# Patient Record
Sex: MALE | Race: WHITE | NOT HISPANIC OR LATINO
[De-identification: names, ages, dates, MRNs, and addresses within clinical notes are randomized per-mention and may not be internally consistent; named-entity substitution may affect disease eponyms.]

---

## 2020-07-06 PROBLEM — Z00.00 ENCOUNTER FOR PREVENTIVE HEALTH EXAMINATION: Status: ACTIVE | Noted: 2020-07-06

## 2020-07-16 ENCOUNTER — APPOINTMENT (OUTPATIENT)
Dept: ORTHOPEDIC SURGERY | Facility: CLINIC | Age: 65
End: 2020-07-16
Payer: COMMERCIAL

## 2020-07-16 VITALS — HEIGHT: 75 IN | BODY MASS INDEX: 21.01 KG/M2 | RESPIRATION RATE: 16 BRPM | WEIGHT: 169 LBS

## 2020-07-16 DIAGNOSIS — L81.9 DISORDER OF PIGMENTATION, UNSPECIFIED: ICD-10-CM

## 2020-07-16 DIAGNOSIS — Z86.59 PERSONAL HISTORY OF OTHER MENTAL AND BEHAVIORAL DISORDERS: ICD-10-CM

## 2020-07-16 DIAGNOSIS — Z87.09 PERSONAL HISTORY OF OTHER DISEASES OF THE NERVOUS SYSTEM AND SENSE ORGANS: ICD-10-CM

## 2020-07-16 DIAGNOSIS — H54.7 UNSPECIFIED VISUAL LOSS: ICD-10-CM

## 2020-07-16 DIAGNOSIS — Z87.898 PERSONAL HISTORY OF OTHER SPECIFIED CONDITIONS: ICD-10-CM

## 2020-07-16 DIAGNOSIS — Z82.61 FAMILY HISTORY OF ARTHRITIS: ICD-10-CM

## 2020-07-16 DIAGNOSIS — Z86.69 PERSONAL HISTORY OF OTHER DISEASES OF THE NERVOUS SYSTEM AND SENSE ORGANS: ICD-10-CM

## 2020-07-16 DIAGNOSIS — S69.90XA UNSPECIFIED INJURY OF UNSPECIFIED WRIST, HAND AND FINGER(S), INITIAL ENCOUNTER: ICD-10-CM

## 2020-07-16 DIAGNOSIS — Z87.438 PERSONAL HISTORY OF OTHER DISEASES OF MALE GENITAL ORGANS: ICD-10-CM

## 2020-07-16 DIAGNOSIS — Z86.2 PERSONAL HISTORY OF DISEASES OF THE BLOOD AND BLOOD-FORMING ORGANS AND CERTAIN DISORDERS INVOLVING THE IMMUNE MECHANISM: ICD-10-CM

## 2020-07-16 DIAGNOSIS — Z82.62 FAMILY HISTORY OF OSTEOPOROSIS: ICD-10-CM

## 2020-07-16 DIAGNOSIS — K31.9 DISEASE OF STOMACH AND DUODENUM, UNSPECIFIED: ICD-10-CM

## 2020-07-16 DIAGNOSIS — Z87.19 PERSONAL HISTORY OF OTHER DISEASES OF THE NERVOUS SYSTEM AND SENSE ORGANS: ICD-10-CM

## 2020-07-16 DIAGNOSIS — Z84.89 FAMILY HISTORY OF OTHER SPECIFIED CONDITIONS: ICD-10-CM

## 2020-07-16 DIAGNOSIS — Z87.19 PERSONAL HISTORY OF OTHER DISEASES OF THE DIGESTIVE SYSTEM: ICD-10-CM

## 2020-07-16 PROCEDURE — 99203 OFFICE O/P NEW LOW 30 MIN: CPT | Mod: 25

## 2020-07-16 PROCEDURE — 73110 X-RAY EXAM OF WRIST: CPT | Mod: 50

## 2020-07-16 PROCEDURE — 20605 DRAIN/INJ JOINT/BURSA W/O US: CPT | Mod: RT

## 2020-07-16 RX ORDER — MESALAMINE 375 MG/1
0.38 CAPSULE, EXTENDED RELEASE ORAL
Refills: 0 | Status: ACTIVE | COMMUNITY

## 2020-07-16 RX ORDER — DIAZEPAM 5 MG/ML
AMPUL (ML) INJECTION
Refills: 0 | Status: ACTIVE | COMMUNITY

## 2020-07-16 RX ORDER — RIFAXIMIN 550 MG/1
TABLET ORAL
Refills: 0 | Status: ACTIVE | COMMUNITY

## 2020-07-16 RX ORDER — BUPROPION HYDROCHLORIDE 75 MG/1
TABLET, FILM COATED ORAL
Refills: 0 | Status: ACTIVE | COMMUNITY

## 2020-07-16 RX ORDER — GLUC/MSM/COLGN2/HYAL/ANTIARTH3 375-375-20
TABLET ORAL
Refills: 0 | Status: ACTIVE | COMMUNITY

## 2020-07-16 RX ORDER — COLESTIPOL HYDROCHLORIDE 1 G/1
TABLET, FILM COATED ORAL
Refills: 0 | Status: ACTIVE | COMMUNITY

## 2020-07-16 RX ORDER — OXYCODONE 10 MG/1
10 TABLET ORAL
Refills: 0 | Status: ACTIVE | COMMUNITY

## 2020-07-16 RX ORDER — CHROMIUM 200 MCG
TABLET ORAL
Refills: 0 | Status: ACTIVE | COMMUNITY

## 2020-07-16 RX ORDER — MESALAMINE 1.2 G/1
TABLET, DELAYED RELEASE ORAL
Refills: 0 | Status: ACTIVE | COMMUNITY

## 2020-07-16 RX ORDER — PSYLLIUM HUSK 0.4 G
CAPSULE ORAL
Refills: 0 | Status: ACTIVE | COMMUNITY

## 2021-08-12 ENCOUNTER — APPOINTMENT (OUTPATIENT)
Dept: ORTHOPEDIC SURGERY | Facility: CLINIC | Age: 66
End: 2021-08-12
Payer: COMMERCIAL

## 2021-08-12 VITALS — RESPIRATION RATE: 16 BRPM | WEIGHT: 169 LBS | BODY MASS INDEX: 21.01 KG/M2 | HEIGHT: 75 IN

## 2021-08-12 DIAGNOSIS — Z87.39 PERSONAL HISTORY OF OTHER DISEASES OF THE MUSCULOSKELETAL SYSTEM AND CONNECTIVE TISSUE: ICD-10-CM

## 2021-08-12 PROCEDURE — 73110 X-RAY EXAM OF WRIST: CPT | Mod: RT

## 2021-08-12 PROCEDURE — 20605 DRAIN/INJ JOINT/BURSA W/O US: CPT | Mod: RT

## 2021-08-12 PROCEDURE — 73070 X-RAY EXAM OF ELBOW: CPT | Mod: RT

## 2021-08-12 PROCEDURE — 99213 OFFICE O/P EST LOW 20 MIN: CPT | Mod: 25

## 2021-08-12 NOTE — ASSESSMENT
[FreeTextEntry1] : Patient symptoms today more consistent with radiocarpal arthritis.  Discussed options and elected to undergo a radiocarpal injection.  Under informed consent sterile conditions radiocarpal injection performed using 1 cc of Kenalog 10 combined with 2% plain lidocaine.  If no significant relief or symptoms recur consider ordering MRI versus CT scan to better plan.  Discussed surgical options including radial styloidectomy combined with AIN PIN neurectomy which would be motion preserving versus PRC which is likely not possible depending on where on head of capitate.  Also discussed potential scaphoid excision and 4 bone fusion.

## 2021-08-12 NOTE — REASON FOR VISIT
[Follow-Up Visit] : a follow-up visit for [Wrist Pain] : wrist pain [Hand Pain] : hand pain [FreeTextEntry2] : right hand/wrist pain

## 2021-08-12 NOTE — HISTORY OF PRESENT ILLNESS
[Right] : right hand dominant [FreeTextEntry1] : 65 year old right hand dominant male here today for follow up for recurrent right hand/wrist pain. We had evaluated him last July 2020 and discussed new diagnosis of basal joint osteoarthritis and received an injection into the right basal joint. Patient reports injection provided no relief and here today to discuss further management. He is considering surgical intervention today.

## 2021-08-12 NOTE — PHYSICAL EXAM
[de-identified] : On exam he is tender over the radial scaphoid joint and radial styloid.  Multiple Heberden's nodes.  Not particularly tender at basal joint today.  Wrist extension 80.  Wrist flexion 35.  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

## 2021-08-30 ENCOUNTER — NON-APPOINTMENT (OUTPATIENT)
Age: 66
End: 2021-08-30

## 2021-09-09 ENCOUNTER — APPOINTMENT (OUTPATIENT)
Dept: ORTHOPEDIC SURGERY | Facility: CLINIC | Age: 66
End: 2021-09-09

## 2022-10-13 RX ORDER — COLESTIPOL HCL 5 G
2 GRANULES (GRAM) ORAL
Qty: 0 | Refills: 0 | DISCHARGE

## 2022-10-13 RX ORDER — DOXEPIN HCL 100 MG
1 CAPSULE ORAL
Qty: 0 | Refills: 0 | DISCHARGE

## 2022-10-13 RX ORDER — BUPROPION HYDROCHLORIDE 150 MG/1
1 TABLET, EXTENDED RELEASE ORAL
Qty: 0 | Refills: 0 | DISCHARGE

## 2022-10-13 RX ORDER — OXYCODONE HYDROCHLORIDE 5 MG/1
1 TABLET ORAL
Qty: 0 | Refills: 0 | DISCHARGE

## 2022-10-13 RX ORDER — L.ACIDOPH/B.ANIMALIS/B.LONGUM 15B CELL
1 CAPSULE ORAL
Qty: 0 | Refills: 0 | DISCHARGE

## 2022-10-13 RX ORDER — MESALAMINE 400 MG
4 TABLET, DELAYED RELEASE (ENTERIC COATED) ORAL
Qty: 0 | Refills: 0 | DISCHARGE

## 2022-10-13 RX ORDER — OMEGA-3 ACID ETHYL ESTERS 1 G
0 CAPSULE ORAL
Qty: 0 | Refills: 0 | DISCHARGE

## 2022-10-13 RX ORDER — CHLORHEXIDINE GLUCONATE 213 G/1000ML
1 SOLUTION TOPICAL ONCE
Refills: 0 | Status: DISCONTINUED | OUTPATIENT
Start: 2022-10-14 | End: 2022-10-14

## 2022-10-13 NOTE — ASU PATIENT PROFILE, ADULT - NSICDXPASTSURGICALHX_GEN_ALL_CORE_FT
PAST SURGICAL HISTORY:  History of colon surgery     S/P shoulder surgery Bilateral  with left clavicle titanium plate

## 2022-10-13 NOTE — ASU PATIENT PROFILE, ADULT - FALL HARM RISK - UNIVERSAL INTERVENTIONS
Bed in lowest position, wheels locked, appropriate side rails in place/Call bell, personal items and telephone in reach/Instruct patient to call for assistance before getting out of bed or chair/Non-slip footwear when patient is out of bed/[place] to call system/Physically safe environment - no spills, clutter or unnecessary equipment/Purposeful Proactive Rounding/Room/bathroom lighting operational, light cord in reach

## 2022-10-13 NOTE — ASU PATIENT PROFILE, ADULT - NS PREOP UNDERSTANDS INFO
bring id, insurance card, wear loose comfort clothes, no food from midnight, water till 4:30 AM./yes

## 2022-10-13 NOTE — ASU PATIENT PROFILE, ADULT - NSICDXPASTMEDICALHX_GEN_ALL_CORE_FT
PAST MEDICAL HISTORY:  2019 novel coronavirus disease (COVID-19) July 4 2022    Anxiety     Crohn disease     Hepatitis B immune     Hepatitis C     Major depression

## 2022-10-14 ENCOUNTER — RESULT REVIEW (OUTPATIENT)
Age: 67
End: 2022-10-14

## 2022-10-14 ENCOUNTER — TRANSCRIPTION ENCOUNTER (OUTPATIENT)
Age: 67
End: 2022-10-14

## 2022-10-14 ENCOUNTER — OUTPATIENT (OUTPATIENT)
Dept: OUTPATIENT SERVICES | Facility: HOSPITAL | Age: 67
LOS: 1 days | Discharge: HOME | End: 2022-10-14

## 2022-10-14 VITALS
SYSTOLIC BLOOD PRESSURE: 115 MMHG | WEIGHT: 169.76 LBS | TEMPERATURE: 98 F | HEIGHT: 75 IN | OXYGEN SATURATION: 98 % | HEART RATE: 88 BPM | RESPIRATION RATE: 16 BRPM | DIASTOLIC BLOOD PRESSURE: 72 MMHG

## 2022-10-14 VITALS
OXYGEN SATURATION: 98 % | DIASTOLIC BLOOD PRESSURE: 74 MMHG | SYSTOLIC BLOOD PRESSURE: 123 MMHG | TEMPERATURE: 97 F | RESPIRATION RATE: 16 BRPM | HEART RATE: 70 BPM

## 2022-10-14 DIAGNOSIS — Z98.890 OTHER SPECIFIED POSTPROCEDURAL STATES: Chronic | ICD-10-CM

## 2022-10-14 PROCEDURE — ZZZZZ: CPT

## 2022-10-14 PROCEDURE — 88311 DECALCIFY TISSUE: CPT | Mod: 26

## 2022-10-14 PROCEDURE — 88304 TISSUE EXAM BY PATHOLOGIST: CPT | Mod: 26

## 2022-10-14 RX ORDER — ACETAMINOPHEN 500 MG
650 TABLET ORAL ONCE
Refills: 0 | Status: DISCONTINUED | OUTPATIENT
Start: 2022-10-14 | End: 2022-10-14

## 2022-10-14 RX ORDER — ONDANSETRON 8 MG/1
4 TABLET, FILM COATED ORAL ONCE
Refills: 0 | Status: DISCONTINUED | OUTPATIENT
Start: 2022-10-14 | End: 2022-10-14

## 2022-10-14 RX ORDER — OXYCODONE AND ACETAMINOPHEN 5; 325 MG/1; MG/1
1 TABLET ORAL EVERY 4 HOURS
Refills: 0 | Status: DISCONTINUED | OUTPATIENT
Start: 2022-10-14 | End: 2022-10-14

## 2022-10-14 RX ORDER — OXYCODONE AND ACETAMINOPHEN 5; 325 MG/1; MG/1
2 TABLET ORAL EVERY 6 HOURS
Refills: 0 | Status: DISCONTINUED | OUTPATIENT
Start: 2022-10-14 | End: 2022-10-14

## 2022-10-14 NOTE — ASU DISCHARGE PLAN (ADULT/PEDIATRIC) - ASU DC SPECIAL INSTRUCTIONSFT
Please call to make follow up appt [phone] for 1 week. Elevate right arm with sling and keep dressing dry and in place. Can shower but keep arm/sling dry. Take pain RX given by Dr. Camarena as needed and indicated. Non weightbearing right arm/hand.

## 2022-10-14 NOTE — ASU DISCHARGE PLAN (ADULT/PEDIATRIC) - NS MD DC FALL RISK RISK
For information on Fall & Injury Prevention, visit: [email or web address] OR  [email or web address] OR  https://www.cdc.gov/steadi/patient.html

## 2022-10-14 NOTE — ASU DISCHARGE PLAN (ADULT/PEDIATRIC) - CARE PROVIDER_API CALL
Amaury Camarena  ORTHOPAEDIC SPORTS MEDICINE  [address] [facility] [address] [facility], [address]  [place], [place] [address]  Phone: [phone]  Fax: [phone]  Follow Up Time:

## 2022-10-25 LAB — SURGICAL PATHOLOGY STUDY: SIGNIFICANT CHANGE UP

## 2023-08-21 NOTE — PRE-ANESTHESIA EVALUATION ADULT - NSANTHNECKRD_ENT_A_CORE
Abraxane infusion complete. Pt tolerated well. VSS. NAD. IV to left forearm. Pt verbalized understanding of discharge instructions before leaving.    
No

## 2023-12-05 PROBLEM — U07.1 COVID-19: Chronic | Status: ACTIVE | Noted: 2022-10-13

## 2023-12-05 PROBLEM — K50.90 CROHN'S DISEASE, UNSPECIFIED, WITHOUT COMPLICATIONS: Chronic | Status: ACTIVE | Noted: 2022-10-13

## 2023-12-05 PROBLEM — Z78.9 OTHER SPECIFIED HEALTH STATUS: Chronic | Status: ACTIVE | Noted: 2022-10-13

## 2023-12-05 PROBLEM — F32.9 MAJOR DEPRESSIVE DISORDER, SINGLE EPISODE, UNSPECIFIED: Chronic | Status: ACTIVE | Noted: 2022-10-13

## 2023-12-05 PROBLEM — B19.20 UNSPECIFIED VIRAL HEPATITIS C WITHOUT HEPATIC COMA: Chronic | Status: ACTIVE | Noted: 2022-10-13

## 2023-12-05 PROBLEM — F41.9 ANXIETY DISORDER, UNSPECIFIED: Chronic | Status: ACTIVE | Noted: 2022-10-13

## 2023-12-13 ENCOUNTER — APPOINTMENT (OUTPATIENT)
Dept: UROLOGY | Facility: CLINIC | Age: 68
End: 2023-12-13
Payer: COMMERCIAL

## 2023-12-13 ENCOUNTER — APPOINTMENT (OUTPATIENT)
Dept: UROLOGY | Facility: CLINIC | Age: 68
End: 2023-12-13

## 2023-12-13 VITALS
HEART RATE: 77 BPM | TEMPERATURE: 98.3 F | SYSTOLIC BLOOD PRESSURE: 101 MMHG | DIASTOLIC BLOOD PRESSURE: 67 MMHG | OXYGEN SATURATION: 98 %

## 2023-12-13 DIAGNOSIS — Z83.79 FAMILY HISTORY OF OTHER DISEASES OF THE DIGESTIVE SYSTEM: ICD-10-CM

## 2023-12-13 DIAGNOSIS — Z86.39 PERSONAL HISTORY OF OTHER ENDOCRINE, NUTRITIONAL AND METABOLIC DISEASE: ICD-10-CM

## 2023-12-13 DIAGNOSIS — Z87.891 PERSONAL HISTORY OF NICOTINE DEPENDENCE: ICD-10-CM

## 2023-12-13 DIAGNOSIS — K50.919 CROHN'S DISEASE, UNSPECIFIED, WITH UNSPECIFIED COMPLICATIONS: ICD-10-CM

## 2023-12-13 PROCEDURE — 99204 OFFICE O/P NEW MOD 45 MIN: CPT

## 2023-12-13 RX ORDER — ROSUVASTATIN CALCIUM 5 MG/1
TABLET, FILM COATED ORAL
Refills: 0 | Status: ACTIVE | COMMUNITY

## 2023-12-13 NOTE — PHYSICAL EXAM
[General Appearance - Well Developed] : well developed [General Appearance - Well Nourished] : well nourished [Normal Appearance] : normal appearance [Well Groomed] : well groomed [General Appearance - In No Acute Distress] : no acute distress [Edema] : no peripheral edema [] : no respiratory distress [Exaggerated Use Of Accessory Muscles For Inspiration] : no accessory muscle use [Urethral Meatus] : meatus normal [Penis Abnormality] : normal circumcised penis [Urinary Bladder Findings] : the bladder was normal on palpation [Scrotum] : the scrotum was normal [Testes Tenderness] : no tenderness of the testes [Testes Mass (___cm)] : there were no testicular masses [Prostate Tenderness] : the prostate was not tender [No Prostate Nodules] : no prostate nodules [Prostate Size ___ gm] : prostate size [unfilled] gm [Normal Station and Gait] : the gait and station were normal for the patient's age [No Focal Deficits] : no focal deficits [Oriented To Time, Place, And Person] : oriented to person, place, and time [Affect] : the affect was normal [Mood] : the mood was normal [Not Anxious] : not anxious [de-identified] :  exam with chaperone Cedrick

## 2023-12-13 NOTE — ASSESSMENT
[FreeTextEntry1] : 67 yo male with elevated PSA 11.6 ng/ml, no prior MRI, biopsy naive, neg FH, neg JIM with slight urgency and weak flow.  1. No concern for UTI however we will r/o infection before proceeding with MRI-- UA UCx 2. Repeat PSA 3. Prostate MRI-- he will schedule this for when he returns from vacation 4. Offered RDP work flow- he declined. He was told this could wait 1-2 years before being evaluated. I did urge the patient to address this in a timely fashion. 5. Flomax offered- he declined 6. Limit fluids 4 hours prior to bed-- he only gets up once   Follow up after MRI for formal image review with Dr Dacosta

## 2023-12-13 NOTE — HISTORY OF PRESENT ILLNESS
[FreeTextEntry1] : Dear Dr. Ross (PCP)  Thank you so much for the referral to help care for your patient.    Chief Complaint: Elevated PSA Date of first visit: 12/13/2023   FELIPE JORDAN  is a 68 year old  man with PMHx Crohn's disease, HLD, anxiety who presents for elevated PSA. His PSA is 11.6 ng/ml. This was repeated after value was 4.1 ng/ml. He is unaware of prior levels. He has never had a prostate MRI, is biopsy naive, and denies family hx of prostate cancer.  He denies infectious urinary symptoms-- no dysuria, frequency, foul odor, hematuria, incomplete emptying. He does experience slight urgency and weak flow x 9 months. He does not feel he needs alpha blockers.  PSA Hx  11.60 12/4/23 4.18 11/6/23    Prostate cancer screening: the patient and I spoke at length about prostate cancer screening, its risks and its benefits. The patient has 2 (age, PSA) risk factors for prostate cancer.  He understands that many men with prostate cancer will die with the disease rather than of it and we also discussed the results large multi-center American and  prostate cancer screening trials. He also understands that PSA in and of itself does not diagnose prostate cancer but only assesses risk to a certain degree. The patient understands that to definitively screen for prostate cancer, a biopsy is required and this procedure has risks, including bleeding, infection, ED and urinary retention. The patient opted to prostate MRI.  The patient denies fevers, chills, nausea and or vomiting and no unexplained weight loss.  All pertinent laboratory, films and physician notes were reviewed.  Questionnaire results were discussed with patient.S

## 2023-12-15 ENCOUNTER — NON-APPOINTMENT (OUTPATIENT)
Age: 68
End: 2023-12-15

## 2023-12-15 LAB
APPEARANCE: CLEAR
BACTERIA UR CULT: NORMAL
BACTERIA: NEGATIVE /HPF
BILIRUBIN URINE: NEGATIVE
BLOOD URINE: NEGATIVE
CAST: 0 /LPF
COLOR: NORMAL
EPITHELIAL CELLS: 0 /HPF
GLUCOSE QUALITATIVE U: NEGATIVE MG/DL
KETONES URINE: NEGATIVE MG/DL
LEUKOCYTE ESTERASE URINE: NEGATIVE
MICROSCOPIC-UA: NORMAL
NITRITE URINE: NEGATIVE
PH URINE: 5
PROTEIN URINE: NEGATIVE MG/DL
PSA FREE FLD-MCNC: 11 %
PSA FREE SERPL-MCNC: 0.66 NG/ML
PSA SERPL-MCNC: 5.84 NG/ML
RED BLOOD CELLS URINE: 0 /HPF
SPECIFIC GRAVITY URINE: 1.02
UROBILINOGEN URINE: 0.2 MG/DL
WHITE BLOOD CELLS URINE: 0 /HPF

## 2024-01-16 ENCOUNTER — OUTPATIENT (OUTPATIENT)
Dept: OUTPATIENT SERVICES | Facility: HOSPITAL | Age: 69
LOS: 1 days | End: 2024-01-16

## 2024-01-16 ENCOUNTER — APPOINTMENT (OUTPATIENT)
Dept: MRI IMAGING | Facility: CLINIC | Age: 69
End: 2024-01-16
Payer: COMMERCIAL

## 2024-01-16 ENCOUNTER — RESULT REVIEW (OUTPATIENT)
Age: 69
End: 2024-01-16

## 2024-01-16 DIAGNOSIS — Z98.890 OTHER SPECIFIED POSTPROCEDURAL STATES: Chronic | ICD-10-CM

## 2024-01-16 PROCEDURE — 76498P: CUSTOM | Mod: 26

## 2024-01-16 PROCEDURE — 72197 MRI PELVIS W/O & W/DYE: CPT | Mod: 26

## 2024-01-16 NOTE — ADDENDUM
[FreeTextEntry1] : I, Dr. Dacosta, personally performed the evaluation and management (E/M) services for this established patient who presents today with (a) new problem(s)/exacerbation of (an) existing condition(s).  That E/M includes conducting the examination, assessing all new/exacerbated conditions, and establishing a new plan of care.  Today, my ACP, Barbara Suarez, was here to observe my evaluation and management services for this new problem/exacerbated condition to be followed going forward.

## 2024-01-17 ENCOUNTER — APPOINTMENT (OUTPATIENT)
Dept: UROLOGY | Facility: CLINIC | Age: 69
End: 2024-01-17
Payer: COMMERCIAL

## 2024-01-17 VITALS
HEIGHT: 75 IN | OXYGEN SATURATION: 97 % | DIASTOLIC BLOOD PRESSURE: 74 MMHG | HEART RATE: 83 BPM | SYSTOLIC BLOOD PRESSURE: 120 MMHG | TEMPERATURE: 97.2 F | BODY MASS INDEX: 20.39 KG/M2 | WEIGHT: 164 LBS

## 2024-01-17 PROCEDURE — 99214 OFFICE O/P EST MOD 30 MIN: CPT

## 2024-01-17 RX ORDER — TAMSULOSIN HYDROCHLORIDE 0.4 MG/1
0.4 CAPSULE ORAL
Qty: 90 | Refills: 3 | Status: ACTIVE | COMMUNITY
Start: 2024-01-17 | End: 1900-01-01

## 2024-01-17 NOTE — PHYSICAL EXAM
[General Appearance - Well Developed] : well developed [Normal Appearance] : normal appearance [Edema] : no peripheral edema [] : no respiratory distress [Urethral Meatus] : meatus normal [Penis Abnormality] : normal circumcised penis [Testes Tenderness] : no tenderness of the testes [Testes Mass (___cm)] : there were no testicular masses [No Prostate Nodules] : no prostate nodules [Prostate Size ___ gm] : prostate size [unfilled] gm [Oriented To Time, Place, And Person] : oriented to person, place, and time [de-identified] : JIM 12/17 negative Suarez [de-identified] : anxious

## 2024-01-17 NOTE — ASSESSMENT
[FreeTextEntry1] : 67 yo male with elevated PSA 11.6 ng/ml, no prior MRI, biopsy naive, neg FH, neg JIM with slight urgency and weak flow.  he could have a shy bladder also PSA / LUTS could be impacted by crohn's  given patients primary issue with LUTS we will address that first then elevated PSA  1. No concern for UTI however we will r/o infection before proceeding with MRI-- UA UCx 2. Repeat PSA in 3 months  3. Prostate MRI-- he will schedule this for when he returns from vacation 4. Flomax started 1/17/24 - The patient understands that this medication may cause dizziness, retrograde ejaculation, or nasal congestion, among other complications. I recommended that the patient take this medication at night. If the side effects become too bothersome, the medication can be discontinued.  5. Limit fluids 4 hours prior to bed-- he only gets up once  Thank you very much for allowing me to assist in the care of this patient. Please do not hesitate to contact me with any additional questions or concerns.      Sincerely,     Jose Dacosta D.O. Professor of Urology and Radiology  of Urology at [facility] Director for Prostate Cancer [address] [facility], [facility] Phone: [phone]

## 2024-01-17 NOTE — HISTORY OF PRESENT ILLNESS
[FreeTextEntry1] : Dear Dr. Ross (PCP)  Thank you so much for the referral to help care for your patient.  Chief Complaint: Elevated PSA, LUTS Date of first visit: 12/13/2023  FELIPE JORDAN is a 68 year old  man with PMHx Crohn's disease, HLD, anxiety who presents for elevated PSA. His PSA is 11.6 ng/ml. This was repeated after value was 4.1 ng/ml. He is unaware of prior levels. He has never had a prostate MRI, is biopsy naive, and denies family hx of prostate cancer.  the patient could have crohn's which could exacerbate LUTS  He denies infectious urinary symptoms-- no dysuria, frequency, foul odor, hematuria, incomplete emptying. He does experience slight urgency and weak flow x 9 months. He does not feel he needs alpha blockers.  PSA Hx 5.84 12/13/2023. PSAD 0.17 ng/ml/cc 11.60 12/4/23 4.18 11/6/23  IPSS 17 QOL 4 GHADA 8  cc  MRI Hx MRI at [facility] on 1/16/2024. 34  cc prostate with PIRADS 3 lesion measuring 8.9mm left base TZa, PIRADS 3 lesion measuring 8.9mm left lateral TZ mid gland, and PIRADS 2 lesion measuring 11mm right para midline TZa.  No LAD No EPE, No Bony Lesions.  The images have been reviewed and clinical implications discussed with the patient. ADDED LEFT MID PZPL - limited ADC but T2 slight abnormality.  it is difficulty given fluctuating PSA and possibility of inflammation as an etiology.  if he is having a biopsy i would add this lesion.  Prostate cancer screening: the patient and I spoke at length about prostate cancer screening, its risks and its benefits. The patient has 2 (age, PSA) risk factors for prostate cancer. He understands that many men with prostate cancer will die with the disease rather than of it and we also discussed the results large multi-center American and  prostate cancer screening trials. He also understands that PSA in and of itself does not diagnose prostate cancer but only assesses risk to a certain degree. The patient understands that to definitively screen for prostate cancer, a biopsy is required and this procedure has risks, including bleeding, infection, ED and urinary retention. The patient opted to hold on biopsy for now and address LUTS.  The patient denies fevers, chills, nausea and or vomiting and no unexplained weight loss.  All pertinent laboratory, films and physician notes were reviewed. Questionnaire results were discussed with patient.

## 2024-05-13 ENCOUNTER — APPOINTMENT (OUTPATIENT)
Dept: UROLOGY | Facility: CLINIC | Age: 69
End: 2024-05-13
Payer: COMMERCIAL

## 2024-05-13 ENCOUNTER — APPOINTMENT (OUTPATIENT)
Dept: UROLOGY | Facility: CLINIC | Age: 69
End: 2024-05-13

## 2024-05-13 VITALS
DIASTOLIC BLOOD PRESSURE: 78 MMHG | HEART RATE: 89 BPM | HEIGHT: 75 IN | TEMPERATURE: 97 F | SYSTOLIC BLOOD PRESSURE: 116 MMHG | BODY MASS INDEX: 1.74 KG/M2 | OXYGEN SATURATION: 96 % | WEIGHT: 14 LBS

## 2024-05-13 DIAGNOSIS — R97.20 ELEVATED PROSTATE, SPECIFIC ANTIGEN [PSA]: ICD-10-CM

## 2024-05-13 DIAGNOSIS — R39.9 UNSPECIFIED SYMPTOMS AND SIGNS INVOLVING THE GENITOURINARY SYSTEM: ICD-10-CM

## 2024-05-13 PROCEDURE — 99214 OFFICE O/P EST MOD 30 MIN: CPT

## 2024-05-13 NOTE — PHYSICAL EXAM
[General Appearance - In No Acute Distress] : no acute distress [] : no respiratory distress [Abdomen Soft] : soft [Abdomen Tenderness] : non-tender [Normal Station and Gait] : the gait and station were normal for the patient's age [No Focal Deficits] : no focal deficits [Oriented To Time, Place, And Person] : oriented to person, place, and time

## 2024-05-13 NOTE — ASSESSMENT
[FreeTextEntry1] : 67 yo male with elevated PSA 11.6 ng/ml, no prior MRI, biopsy naive, neg FH, neg JIM with slight urgency and weak flow. he could have a shy bladder also PSA / LUTS could be impacted by crohn's  given patients primary issue with LUTS we will address that first then elevated PSA  1. Repeat PSA 5/13/24, will revisit need for prostate biopsy based on level   2. Urinary symptoms have improved with Flomax, PVR 5/13/24 64cc, continue medication, no SEs so far  3. Limit fluids 4 hours prior to bed   Thank you very much for allowing me to assist in the care of this patient. Please do not hesitate to contact me with any additional questions or concerns.

## 2024-05-13 NOTE — HISTORY OF PRESENT ILLNESS
[FreeTextEntry1] : Dear Dr. Ross (PCP)  Thank you so much for the referral to help care for your patient.  Chief Complaint: Elevated PSA, LUTS Date of first visit: 12/13/2023  FELIPE JORDAN is a 68 year old  man with PMHx Crohn's disease, HLD, anxiety who presents for elevated PSA. His PSA is 11.6 ng/ml. This was repeated after value was 4.1 ng/ml. He is unaware of prior levels. This is his first prostate MRI, is biopsy naive, and denies family hx of prostate cancer. the patient has Crohn's which could exacerbate LUTS  He denies infectious urinary symptoms-- no dysuria, frequency, foul odor, hematuria, incomplete emptying. He does experience slight urgency and weak flow x 9 months. He does not feel he needs alpha blockers.  PSA Hx 5.84 12/13/2023. PSAD 0.17 ng/ml/cc 11.60 12/4/23 4.18 11/6/23 5/13/24  IPSS 11 QOL 3  Unable to uroflow  PVR 64 cc  IPSS 17 QOL 4 GHADA 8  cc  MRI Hx MRI at [facility] on 1/16/2024. 34 cc prostate with PIRADS 3 lesion measuring 8.9mm left base TZa, PIRADS 3 lesion measuring 8.9mm left lateral TZ mid gland, and PIRADS 2 lesion measuring 11mm right para midline TZa. No LAD No EPE, No Bony Lesions. The images have been reviewed and clinical implications discussed with the patient. ADDED LEFT MID PZPL - limited ADC but T2 slight abnormality. it is difficulty given fluctuating PSA and possibility of inflammation as an etiology. if he is having a biopsy i would add this lesion.  The patient denies fevers, chills, nausea and or vomiting and no unexplained weight loss.  All pertinent laboratory, films and physician notes were reviewed. Questionnaire results were discussed with patient.

## 2024-05-16 LAB — PSA SERPL-MCNC: 3.61 NG/ML

## 2024-10-02 ENCOUNTER — APPOINTMENT (OUTPATIENT)
Dept: UROLOGY | Facility: CLINIC | Age: 69
End: 2024-10-02
Payer: COMMERCIAL

## 2024-10-02 VITALS
HEART RATE: 100 BPM | BODY MASS INDEX: 21.97 KG/M2 | WEIGHT: 173 LBS | SYSTOLIC BLOOD PRESSURE: 109 MMHG | TEMPERATURE: 97.2 F | HEIGHT: 74.5 IN | OXYGEN SATURATION: 96 % | DIASTOLIC BLOOD PRESSURE: 69 MMHG

## 2024-10-02 DIAGNOSIS — N52.9 MALE ERECTILE DYSFUNCTION, UNSPECIFIED: ICD-10-CM

## 2024-10-02 DIAGNOSIS — R39.9 UNSPECIFIED SYMPTOMS AND SIGNS INVOLVING THE GENITOURINARY SYSTEM: ICD-10-CM

## 2024-10-02 DIAGNOSIS — R97.20 ELEVATED PROSTATE, SPECIFIC ANTIGEN [PSA]: ICD-10-CM

## 2024-10-02 PROCEDURE — 99214 OFFICE O/P EST MOD 30 MIN: CPT

## 2024-10-02 RX ORDER — TADALAFIL 5 MG/1
5 TABLET ORAL
Qty: 90 | Refills: 1 | Status: ACTIVE | COMMUNITY
Start: 2024-10-02 | End: 1900-01-01

## 2024-10-02 NOTE — HISTORY OF PRESENT ILLNESS
[FreeTextEntry1] : Dear Dr. Ross (PCP)  Thank you so much for the referral to help care for your patient.  Chief Complaint: Elevated PSA, LUTS Date of first visit: 12/13/2023  FELIPE JORDAN is a 68-year-old  man with PMHx Crohn's disease, HLD, anxiety who presents for elevated PSA. His PSA is 11.6 ng/ml. This was repeated after value was 4.1 ng/ml. He is unaware of prior levels. He has never had a prostate MRI, is biopsy naive, and denies family hx of prostate cancer. the patient could have crohn's which could exacerbate LUTS  He denies infectious urinary symptoms-- no dysuria, frequency, foul odor, hematuria, incomplete emptying. He does experience slight urgency and weak flow x 9 months. He does not feel he needs alpha blockers.  PSA Hx 3.61 05/13/2024 PSAD 0.10 ng/ml/cc  5.84 12/13/2023. PSAD 0.17 ng/ml/cc 11.60 12/4/23 4.18 11/6/23  10/02/2024 IPSS 9    QOL 2 GHADA  6 PVR:  IPSS 17 QOL 4 GHADA 8  cc  MRI Hx MRI at [facility] on 1/16/2024. 34 cc prostate with PIRADS 3 lesion measuring 8.9mm left base TZa, PIRADS 3 lesion measuring 8.9mm left lateral TZ mid gland, and PIRADS 2 lesion measuring 11mm right para midline TZa. No LAD No EPE, No Bony Lesions. The images have been reviewed and clinical implications discussed with the patient. ADDED LEFT MID PZPL - limited ADC but T2 slight abnormality. it is difficulty given fluctuating PSA and possibility of inflammation as an etiology. if he is having a biopsy i would add this lesion.  Prostate cancer screening: the patient and I spoke at length about prostate cancer screening, its risks and its benefits. The patient has 2 (age, PSA) risk factors for prostate cancer. He understands that many men with prostate cancer will die with the disease rather than of it and we also discussed the results large multi-center American and  prostate cancer screening trials. He also understands that PSA in and of itself does not diagnose prostate cancer but only assesses risk to a certain degree. The patient understands that to definitively screen for prostate cancer, a biopsy is required and this procedure has risks, including bleeding, infection, ED and urinary retention. The patient opted to hold on biopsy for now and address LUTS.  The patient denies fevers, chills, nausea and or vomiting and no unexplained weight loss.  All pertinent laboratory, films and physician notes were reviewed. Questionnaire results were discussed with patient.

## 2024-10-02 NOTE — ASSESSMENT
[FreeTextEntry1] : 69 yo male with elevated PSA 11.6 ng/ml, no prior MRI, biopsy naive, neg FH, neg JIM with slight urgency and weak flow. he could have a shy bladder also PSA / LUTS could be impacted by crohn's  given patients primary issue with LUTS we will address that first then elevated PSA - Flomax started 1/17/24, symptoms significantly improved with medication - Limit fluids 4 hours prior to bed-- he only gets up once - continue flomax   elevated psa - psa now trending down, likely elevated due to obstructive symptoms - check psa 10/2/24   ED -  tadalafil trial  Based on the patient's history, he was prescribed Cialis    The patient understands that the risks of this medication include facial redness, flushing, GERD, back pain, priapism, chest pain/MI, dizziness, drop in BP, loss of vision, blurry vision and loss of hearing. He has no history of unstable angina, SOB on exertion and or chest pain.   The patient has been screened for potential medication interactions. He is not on any po antifungals or HIV meds. He is taking tamsulosin. He understands he cannot take within 4 hours from tadalafil.    I recommended that the patient take the first dose by himself. He knows that the medication may take up to 45 minutes to work (or longer on a full stomach) and requires sexual stimulation. He will come to the ER for priapism, chest pain, or loss of vision or hearing. The patient understood all of these instructions.   Prescription was sent to the pharmacy.   Take pill only on days when you want an erection. The pill should be taken at least an hour prior to attempting to initiating erections. Avoid food for an hour before and after taking the pill, since food will interfere with absorption and the pill will be less effective. Once the pill is working, you have a window of 10-12 hours during which it should help you but only if you are sexually stimulated. The most common side effects (not experienced by everyone) are headache, facial flushing, sensation that your heart is pounding, nasal congestion, and blue-tinted vision. You can take an ibuprofen if needed. These side effects are self-limiting and will pass. Most men who have side effects notice that they significantly diminish with repeated use of the medication. If you have an erection lasting 2 hours (in the absence of sexual stimulation) take over the counter sudafed to counteract it. At 3 hours, you should head to the Emergency Department.   Thank you very much for allowing me to assist in the care of this patient. Please do not hesitate to contact me with any additional questions or concerns.     Sincerely,     Jose Dacosta D.O. Professor of Urology and Radiology  of Urology at [facility] Director for Prostate Cancer [address] [place] [address] Phone: [phone]  I was present with the Resident during the key portions of the history and exam. I discussed the case with the Resident and agree with the findings and plan as documented in the Resident/Fellow 's note, unless noted below.

## 2024-10-02 NOTE — ASSESSMENT
[FreeTextEntry1] : 67 yo male with elevated PSA 11.6 ng/ml, no prior MRI, biopsy naive, neg FH, neg JIM with slight urgency and weak flow. he could have a shy bladder also PSA / LUTS could be impacted by crohn's  given patients primary issue with LUTS we will address that first then elevated PSA - Flomax started 1/17/24, symptoms significantly improved with medication - Limit fluids 4 hours prior to bed-- he only gets up once - continue flomax   elevated psa - psa now trending down, likely elevated due to obstructive symptoms - check psa 10/2/24   ED -  tadalafil trial  Based on the patient's history, he was prescribed Cialis    The patient understands that the risks of this medication include facial redness, flushing, GERD, back pain, priapism, chest pain/MI, dizziness, drop in BP, loss of vision, blurry vision and loss of hearing. He has no history of unstable angina, SOB on exertion and or chest pain.   The patient has been screened for potential medication interactions. He is not on any po antifungals or HIV meds. He is taking tamsulosin. He understands he cannot take within 4 hours from tadalafil.    I recommended that the patient take the first dose by himself. He knows that the medication may take up to 45 minutes to work (or longer on a full stomach) and requires sexual stimulation. He will come to the ER for priapism, chest pain, or loss of vision or hearing. The patient understood all of these instructions.   Prescription was sent to the pharmacy.   Take pill only on days when you want an erection. The pill should be taken at least an hour prior to attempting to initiating erections. Avoid food for an hour before and after taking the pill, since food will interfere with absorption and the pill will be less effective. Once the pill is working, you have a window of 10-12 hours during which it should help you but only if you are sexually stimulated. The most common side effects (not experienced by everyone) are headache, facial flushing, sensation that your heart is pounding, nasal congestion, and blue-tinted vision. You can take an ibuprofen if needed. These side effects are self-limiting and will pass. Most men who have side effects notice that they significantly diminish with repeated use of the medication. If you have an erection lasting 2 hours (in the absence of sexual stimulation) take over the counter sudafed to counteract it. At 3 hours, you should head to the Emergency Department.   Thank you very much for allowing me to assist in the care of this patient. Please do not hesitate to contact me with any additional questions or concerns.     Sincerely,     Jose Dacosta D.O. Professor of Urology and Radiology  of Urology at [facility] Director for Prostate Cancer [address] [place] [address] Phone: [phone]  I was present with the Resident during the key portions of the history and exam. I discussed the case with the Resident and agree with the findings and plan as documented in the Resident/Fellow 's note, unless noted below.

## 2024-10-02 NOTE — ADDENDUM
[FreeTextEntry1] :   I, Dr. Dacosta, personally performed the evaluation and management (E/M) services for this established patient who presents today with (a) new problem(s)/exacerbation of (an) existing condition(s).  That E/M includes conducting the examination, assessing all new/exacerbated conditions, and establishing a new plan of care.  Today, my ACP, Eli House, ANP-BC, was here to observe my evaluation and management services for this new problem/exacerbated condition to be followed going forward.

## 2024-10-02 NOTE — ASSESSMENT
[FreeTextEntry1] : 67 yo male with elevated PSA 11.6 ng/ml, no prior MRI, biopsy naive, neg FH, neg JIM with slight urgency and weak flow. he could have a shy bladder also PSA / LUTS could be impacted by crohn's  given patients primary issue with LUTS we will address that first then elevated PSA - Flomax started 1/17/24, symptoms significantly improved with medication - Limit fluids 4 hours prior to bed-- he only gets up once - continue flomax   elevated psa - psa now trending down, likely elevated due to obstructive symptoms - check psa 10/2/24   ED -  tadalafil trial  Based on the patient's history, he was prescribed Cialis    The patient understands that the risks of this medication include facial redness, flushing, GERD, back pain, priapism, chest pain/MI, dizziness, drop in BP, loss of vision, blurry vision and loss of hearing. He has no history of unstable angina, SOB on exertion and or chest pain.   The patient has been screened for potential medication interactions. He is not on any po antifungals or HIV meds. He is taking tamsulosin. He understands he cannot take within 4 hours from tadalafil.    I recommended that the patient take the first dose by himself. He knows that the medication may take up to 45 minutes to work (or longer on a full stomach) and requires sexual stimulation. He will come to the ER for priapism, chest pain, or loss of vision or hearing. The patient understood all of these instructions.   Prescription was sent to the pharmacy.   Take pill only on days when you want an erection. The pill should be taken at least an hour prior to attempting to initiating erections. Avoid food for an hour before and after taking the pill, since food will interfere with absorption and the pill will be less effective. Once the pill is working, you have a window of 10-12 hours during which it should help you but only if you are sexually stimulated. The most common side effects (not experienced by everyone) are headache, facial flushing, sensation that your heart is pounding, nasal congestion, and blue-tinted vision. You can take an ibuprofen if needed. These side effects are self-limiting and will pass. Most men who have side effects notice that they significantly diminish with repeated use of the medication. If you have an erection lasting 2 hours (in the absence of sexual stimulation) take over the counter sudafed to counteract it. At 3 hours, you should head to the Emergency Department.   Thank you very much for allowing me to assist in the care of this patient. Please do not hesitate to contact me with any additional questions or concerns.     Sincerely,     Jose Dacosta D.O. Professor of Urology and Radiology  of Urology at [facility] Director for Prostate Cancer [address] [place] [address] Phone: [phone]  I was present with the Resident during the key portions of the history and exam. I discussed the case with the Resident and agree with the findings and plan as documented in the Resident/Fellow 's note, unless noted below.

## 2024-10-02 NOTE — HISTORY OF PRESENT ILLNESS
[FreeTextEntry1] : Dear Dr. Ross (PCP)  Thank you so much for the referral to help care for your patient.  Chief Complaint: Elevated PSA, LUTS Date of first visit: 12/13/2023  FELIPE JORDAN is a 68-year-old  man with PMHx Crohn's disease, HLD, anxiety who presents for elevated PSA. His PSA is 11.6 ng/ml. This was repeated after value was 4.1 ng/ml. He is unaware of prior levels. He has never had a prostate MRI, is biopsy naive, and denies family hx of prostate cancer. the patient could have crohn's which could exacerbate LUTS  He denies infectious urinary symptoms-- no dysuria, frequency, foul odor, hematuria, incomplete emptying. He does experience slight urgency and weak flow x 9 months. He does not feel he needs alpha blockers.  PSA Hx 3.61 05/13/2024 PSAD 0.10 ng/ml/cc  5.84 12/13/2023. PSAD 0.17 ng/ml/cc 11.60 12/4/23 4.18 11/6/23  10/02/2024 IPSS 9    QOL 2 GHADA  6 PVR:  IPSS 17 QOL 4 GHADA 8  cc  MRI Hx MRI at [facility] on 1/16/2024. 34 cc prostate with PIRADS 3 lesion measuring 8.9mm left base TZa, PIRADS 3 lesion measuring 8.9mm left lateral TZ mid gland, and PIRADS 2 lesion measuring 11mm right para midline TZa. No LAD No EPE, No Bony Lesions. The images have been reviewed and clinical implications discussed with the patient. ADDED LEFT MID PZPL - limited ADC but T2 slight abnormality. it is difficulty given fluctuating PSA and possibility of inflammation as an etiology. if he is having a biopsy i would add this lesion.  Prostate cancer screening: the patient and I spoke at length about prostate cancer screening, its risks and its benefits. The patient has 2 (age, PSA) risk factors for prostate cancer. He understands that many men with prostate cancer will die with the disease rather than of it and we also discussed the results large multi-center [place] and  prostate cancer screening trials. He also understands that PSA in and of itself does not diagnose prostate cancer but only assesses risk to a certain degree. The patient understands that to definitively screen for prostate cancer, a biopsy is required and this procedure has risks, including bleeding, infection, ED and urinary retention. The patient opted to hold on biopsy for now and address LUTS.  The patient denies fevers, chills, nausea and or vomiting and no unexplained weight loss.  All pertinent laboratory, films and physician notes were reviewed. Questionnaire results were discussed with patient.

## 2024-10-02 NOTE — HISTORY OF PRESENT ILLNESS
[FreeTextEntry1] : Dear Dr. Ross (PCP)  Thank you so much for the referral to help care for your patient.  Chief Complaint: Elevated PSA, LUTS Date of first visit: 12/13/2023  FELIPE JORDAN is a 68-year-old  man with PMHx Crohn's disease, HLD, anxiety who presents for elevated PSA. His PSA is 11.6 ng/ml. This was repeated after value was 4.1 ng/ml. He is unaware of prior levels. He has never had a prostate MRI, is biopsy naive, and denies family hx of prostate cancer. the patient could have crohn's which could exacerbate LUTS  He denies infectious urinary symptoms-- no dysuria, frequency, foul odor, hematuria, incomplete emptying. He does experience slight urgency and weak flow x 9 months. He does not feel he needs alpha blockers.  PSA Hx 3.61 05/13/2024 PSAD 0.10 ng/ml/cc  5.84 12/13/2023. PSAD 0.17 ng/ml/cc 11.60 12/4/23 4.18 11/6/23  10/02/2024 IPSS 9    QOL 2 GHADA  6 PVR:  IPSS 17 QOL 4 GHADA 8  cc  MRI Hx MRI at [facility] on 1/16/2024. 34 cc prostate with PIRADS 3 lesion measuring 8.9mm left base TZa, PIRADS 3 lesion measuring 8.9mm left lateral TZ mid gland, and PIRADS 2 lesion measuring 11mm right para midline TZa. No LAD No EPE, No Bony Lesions. The images have been reviewed and clinical implications discussed with the patient. ADDED LEFT MID PZPL - limited ADC but T2 slight abnormality. it is difficulty given fluctuating PSA and possibility of inflammation as an etiology. if he is having a biopsy i would add this lesion.  Prostate cancer screening: the patient and I spoke at length about prostate cancer screening, its risks and its benefits. The patient has 2 (age, PSA) risk factors for prostate cancer. He understands that many men with prostate cancer will die with the disease rather than of it and we also discussed the results large multi-center [place] and  prostate cancer screening trials. He also understands that PSA in and of itself does not diagnose prostate cancer but only assesses risk to a certain degree. The patient understands that to definitively screen for prostate cancer, a biopsy is required and this procedure has risks, including bleeding, infection, ED and urinary retention. The patient opted to hold on biopsy for now and address LUTS.  The patient denies fevers, chills, nausea and or vomiting and no unexplained weight loss.  All pertinent laboratory, films and physician notes were reviewed. Questionnaire results were discussed with patient.

## 2024-10-03 LAB
PSA FREE FLD-MCNC: 21 %
PSA FREE SERPL-MCNC: 0.53 NG/ML
PSA SERPL-MCNC: 2.52 NG/ML

## 2024-12-11 RX ORDER — TAMSULOSIN HYDROCHLORIDE 0.4 MG/1
0.4 CAPSULE ORAL
Qty: 30 | Refills: 3 | Status: ACTIVE | COMMUNITY
Start: 2024-12-11 | End: 1900-01-01

## 2024-12-19 RX ORDER — TADALAFIL 5 MG/1
5 TABLET ORAL
Qty: 30 | Refills: 2 | Status: ACTIVE | COMMUNITY
Start: 2024-12-19 | End: 1900-01-01

## 2025-03-12 RX ORDER — TAMSULOSIN HYDROCHLORIDE 0.4 MG/1
0.4 CAPSULE ORAL
Qty: 30 | Refills: 3 | Status: ACTIVE | COMMUNITY
Start: 2025-03-12 | End: 1900-01-01

## 2025-04-03 ENCOUNTER — APPOINTMENT (OUTPATIENT)
Dept: UROLOGY | Facility: CLINIC | Age: 70
End: 2025-04-03
Payer: COMMERCIAL

## 2025-04-03 VITALS
WEIGHT: 165 LBS | BODY MASS INDEX: 20.95 KG/M2 | HEART RATE: 83 BPM | SYSTOLIC BLOOD PRESSURE: 109 MMHG | DIASTOLIC BLOOD PRESSURE: 68 MMHG | OXYGEN SATURATION: 97 % | TEMPERATURE: 98 F | HEIGHT: 74.5 IN

## 2025-04-03 DIAGNOSIS — R39.9 UNSPECIFIED SYMPTOMS AND SIGNS INVOLVING THE GENITOURINARY SYSTEM: ICD-10-CM

## 2025-04-03 DIAGNOSIS — R97.20 ELEVATED PROSTATE, SPECIFIC ANTIGEN [PSA]: ICD-10-CM

## 2025-04-03 PROCEDURE — 99213 OFFICE O/P EST LOW 20 MIN: CPT

## 2025-04-04 LAB
PSA FREE FLD-MCNC: 20 %
PSA FREE SERPL-MCNC: 0.57 NG/ML
PSA SERPL-MCNC: 2.87 NG/ML

## (undated) DEVICE — GLV 7.5 PROTEXIS (WHITE)

## (undated) DEVICE — PREP ALCOHOL PAD MED

## (undated) DEVICE — SLV COMPRESSION KNEE MED

## (undated) DEVICE — WARMING BLANKET LOWER ADULT

## (undated) DEVICE — DRSG CAST PLASTER GYPSONA FAST 4"

## (undated) DEVICE — GOWN XXL

## (undated) DEVICE — TOURNIQUET CUFF 18" DUAL PORT SINGLE BLADDER W PLC  (BLACK)

## (undated) DEVICE — GLV 8 PROTEXIS (WHITE)

## (undated) DEVICE — DRAPE C ARM MINI PACK FOR 6800

## (undated) DEVICE — SLING ARM CHIEFTAIN MESH LARGE

## (undated) DEVICE — PACK HAND